# Patient Record
Sex: FEMALE | Race: BLACK OR AFRICAN AMERICAN
[De-identification: names, ages, dates, MRNs, and addresses within clinical notes are randomized per-mention and may not be internally consistent; named-entity substitution may affect disease eponyms.]

---

## 2022-05-04 ENCOUNTER — DASHBOARD ENCOUNTERS (OUTPATIENT)
Age: 47
End: 2022-05-04

## 2022-05-04 ENCOUNTER — WEB ENCOUNTER (OUTPATIENT)
Dept: URBAN - METROPOLITAN AREA CLINIC 105 | Facility: CLINIC | Age: 47
End: 2022-05-04

## 2022-05-04 ENCOUNTER — OFFICE VISIT (OUTPATIENT)
Dept: URBAN - METROPOLITAN AREA CLINIC 105 | Facility: CLINIC | Age: 47
End: 2022-05-04
Payer: COMMERCIAL

## 2022-05-04 DIAGNOSIS — R10.30 LOWER ABDOMINAL PAIN: ICD-10-CM

## 2022-05-04 DIAGNOSIS — R11.2 NAUSEA WITH VOMITING, UNSPECIFIED: ICD-10-CM

## 2022-05-04 DIAGNOSIS — R19.7 DIARRHEA, UNSPECIFIED TYPE: ICD-10-CM

## 2022-05-04 DIAGNOSIS — R71.8 MICROCYTOSIS: ICD-10-CM

## 2022-05-04 PROBLEM — 165474009: Status: ACTIVE | Noted: 2022-05-04

## 2022-05-04 PROCEDURE — 99204 OFFICE O/P NEW MOD 45 MIN: CPT | Performed by: INTERNAL MEDICINE

## 2022-05-04 RX ORDER — ONDANSETRON 4 MG/1
1 TABLET ON THE TONGUE AND ALLOW TO DISSOLVE TABLET, ORALLY DISINTEGRATING ORAL ONCE A DAY
Qty: 30 | OUTPATIENT
Start: 2022-05-04

## 2022-05-04 RX ORDER — HYOSCYAMINE SULFATE 0.12 MG/1
1 TABLET TABLET SUBLINGUAL
Qty: 30 | Refills: 2 | OUTPATIENT
Start: 2022-05-04 | End: 2022-08-02

## 2022-05-04 RX ORDER — ZOLPIDEM TARTRATE 10 MG/1
TAKE1 TABLET AT BEDTIME 10 MG ONCE A DAY TABLET ORAL
Qty: 30 | Refills: 2 | Status: ACTIVE | COMMUNITY

## 2022-05-04 RX ORDER — OLMESARTAN MEDOXOMIL-HYDROCHLOROTHIAZIDE 25; 40 MG/1; MG/1
TAKE 1 TABLET BY ORAL ROUTE ONCE DAILY TABLET, FILM COATED ORAL 1
Qty: 0 | Refills: 0 | Status: ACTIVE | COMMUNITY
Start: 1900-01-01

## 2022-05-04 NOTE — HPI-TODAY'S VISIT:
The patient presents for acute lower abdominal pain, diarrhea, and nausea/vomiting.   Today, the patient reports onset of "sharp, stabbing" lower abdominal pain, diarrhea, and nausea/vomiting 9 days ago. She reports diarrhea w/ every meal and daily nausea with occasional emesis with a meal. Last emesis was this morning at 3 am 3x - denies hematemesis. Her last BM was this morning. She has 2-3 BMs QD. Lower abdominal pain is exacerbated by eating. 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   She went to [facility] this morning - labs and urine done, but she left before being seen by a physician due to the long wait-time. Her PCP is Dr. John Sky at [facility]. She saw her PCP a week ago - sent home with an abx without a stool study being done. The abx (does not remember the name) resolved her pain; however, she d/c due to having chronic headaches in addition to exacerbated nausea and diarrhea.  Labs 5/4/22 - CBC normal except MCV 75, platelets 490. CMP normal except ALT 13. Lipase normal.

## 2022-05-06 LAB
FERRITIN, SERUM: 23
IMMUNOGLOBULIN A, QN, SERUM: 301
IRON BIND.CAP.(TIBC): 378
IRON SATURATION: 21
IRON: 81
LIPASE: 42
T-TRANSGLUTAMINASE (TTG) IGA: <2
T4,FREE(DIRECT): 1.15
TSH: 0.68
UIBC: 297

## 2022-05-26 ENCOUNTER — ERX REFILL RESPONSE (OUTPATIENT)
Dept: URBAN - METROPOLITAN AREA CLINIC 105 | Facility: CLINIC | Age: 47
End: 2022-05-26

## 2022-05-26 RX ORDER — HYOSCYAMINE SULFATE 0.12 MG/1
TAKE 1 TABLET SUBLINGUAL EVERY 6 HOURS AS NEEDED NAUSEA 30 DAYS TABLET ORAL; SUBLINGUAL
Qty: 30 TABLET | Refills: 3 | OUTPATIENT

## 2022-05-26 RX ORDER — HYOSCYAMINE SULFATE 0.12 MG/1
1 TABLET TABLET SUBLINGUAL
Qty: 30 | Refills: 2 | OUTPATIENT

## 2022-05-31 ENCOUNTER — ERX REFILL RESPONSE (OUTPATIENT)
Dept: URBAN - METROPOLITAN AREA CLINIC 105 | Facility: CLINIC | Age: 47
End: 2022-05-31

## 2022-05-31 RX ORDER — ONDANSETRON 4 MG/1
LET 1 TABLET DISSOLVE ON TOP OF THE TONGUE EVERY DAY FOR 30 DAYS TABLET, ORALLY DISINTEGRATING ORAL
Qty: 30 TABLET | Refills: 3 | OUTPATIENT

## 2022-05-31 RX ORDER — ONDANSETRON 4 MG/1
1 TABLET ON THE TONGUE AND ALLOW TO DISSOLVE TABLET, ORALLY DISINTEGRATING ORAL ONCE A DAY
Qty: 30 | OUTPATIENT

## 2022-08-27 ENCOUNTER — ERX REFILL RESPONSE (OUTPATIENT)
Dept: URBAN - METROPOLITAN AREA CLINIC 105 | Facility: CLINIC | Age: 47
End: 2022-08-27

## 2022-08-27 RX ORDER — ONDANSETRON 4 MG/1
LET 1 TABLET DISSOLVE ON TOP OF THE TONGUE EVERY DAY FOR 30 DAYS TABLET, ORALLY DISINTEGRATING ORAL
Qty: 30 TABLET | Refills: 3 | OUTPATIENT

## 2022-08-27 RX ORDER — ONDANSETRON 4 MG/1
TAKE 1 TABLET DISSOLVE ON TOP OF THE TONGUE EVERY DAY FOR 30 DAYS TABLET, ORALLY DISINTEGRATING ORAL
Qty: 30 TABLET | Refills: 2 | OUTPATIENT

## 2022-09-06 ENCOUNTER — ERX REFILL RESPONSE (OUTPATIENT)
Dept: URBAN - METROPOLITAN AREA CLINIC 105 | Facility: CLINIC | Age: 47
End: 2022-09-06

## 2022-09-06 RX ORDER — HYOSCYAMINE SULFATE 0.12 MG/1
TAKE 1 TABLET SUBLINGUAL EVERY 6 HOURS AS NEEDED NAUSEA 30 DAYS TABLET ORAL; SUBLINGUAL
Qty: 30 TABLET | Refills: 3 | OUTPATIENT

## 2022-11-05 ENCOUNTER — ERX REFILL RESPONSE (OUTPATIENT)
Dept: URBAN - METROPOLITAN AREA CLINIC 105 | Facility: CLINIC | Age: 47
End: 2022-11-05

## 2022-11-05 RX ORDER — ONDANSETRON 4 MG/1
TAKE 1 TABLET DISSOLVE ON TOP OF THE TONGUE EVERY DAY FOR 30 DAYS TABLET, ORALLY DISINTEGRATING ORAL
Qty: 30 TABLET | Refills: 2 | OUTPATIENT